# Patient Record
Sex: MALE | Race: WHITE | URBAN - METROPOLITAN AREA
[De-identification: names, ages, dates, MRNs, and addresses within clinical notes are randomized per-mention and may not be internally consistent; named-entity substitution may affect disease eponyms.]

---

## 2020-11-02 ENCOUNTER — APPOINTMENT (RX ONLY)
Dept: URBAN - METROPOLITAN AREA CLINIC 170 | Facility: CLINIC | Age: 30
Setting detail: DERMATOLOGY
End: 2020-11-02

## 2020-11-02 DIAGNOSIS — L259 CONTACT DERMATITIS AND OTHER ECZEMA, UNSPECIFIED CAUSE: ICD-10-CM

## 2020-11-02 PROBLEM — L23.9 ALLERGIC CONTACT DERMATITIS, UNSPECIFIED CAUSE: Status: ACTIVE | Noted: 2020-11-02

## 2020-11-02 PROCEDURE — ? TREATMENT REGIMEN

## 2020-11-02 PROCEDURE — ? COUNSELING

## 2020-11-02 PROCEDURE — 99202 OFFICE O/P NEW SF 15 MIN: CPT

## 2020-11-02 PROCEDURE — ? PRESCRIPTION

## 2020-11-02 PROCEDURE — ? OTHER

## 2020-11-02 RX ORDER — PIMECROLIMUS 10 MG/G
CREAM TOPICAL
Qty: 1 | Refills: 6 | Status: ERX | COMMUNITY
Start: 2020-11-02

## 2020-11-02 RX ADMIN — PIMECROLIMUS: 10 CREAM TOPICAL at 00:00

## 2020-11-02 ASSESSMENT — LOCATION DETAILED DESCRIPTION DERM
LOCATION DETAILED: RIGHT INFERIOR POSTAURICULAR SKIN
LOCATION DETAILED: LEFT INFERIOR POSTAURICULAR SKIN
LOCATION DETAILED: LEFT CENTRAL MALAR CHEEK
LOCATION DETAILED: STERNUM
LOCATION DETAILED: RIGHT CENTRAL MALAR CHEEK

## 2020-11-02 ASSESSMENT — LOCATION SIMPLE DESCRIPTION DERM
LOCATION SIMPLE: SCALP
LOCATION SIMPLE: RIGHT CHEEK
LOCATION SIMPLE: CHEST
LOCATION SIMPLE: LEFT CHEEK

## 2020-11-02 ASSESSMENT — LOCATION ZONE DERM
LOCATION ZONE: SCALP
LOCATION ZONE: TRUNK
LOCATION ZONE: FACE

## 2020-11-02 NOTE — PROCEDURE: TREATMENT REGIMEN
Continue Regimen: Patient may continue with duobrii and eucrisa. \\nHas prescriptions at home.
Plan: [facility]\\nRequesting path results from biopsy done at [facility] with Dr Ruben Patel.
Detail Level: Zone

## 2020-11-02 NOTE — PROCEDURE: OTHER
Note Text (......Xxx Chief Complaint.): This diagnosis correlates with the
Detail Level: Detailed
Other (Free Text): Here for second opinion about his dermatitis diagnosed by Ruben Patel at [facility]\\nBiopsied on left shoulder - will get the records\\nTold it was dermatitis\\n\\nRash on face and chest for over a year\\non face and L shoulder\\nbehind ears and post neck\\nUsing Duobri sparingly as needed for flares - helps\\nUsing Eucrisa every night - seems to help\\n\\nUsing aquaphor and eucerin daily \\nHas been on this regimen for a year\\nShe recommended injections once a month, but he does not remember the name of injection (poss Dupixant?)\\n\\nHas been using a non-paraben shampoo x many years\\nSame brand conditioner\\nUsed to use hair gel, but not lately while working from home\\nYet he is still getting the rash\\nUsing Revision skin care wash, for sensitive skin, cleansing lotion x over a year\\nEucerin daily hydration x 1 year  - started after the rash started\\nOld spice body wash x 3 years or so, but not on the face\\nKirkland pods in laundry for many years\\nDowny fabric softener free and clear x 6 months\\n\\nI suspect a product like his shampoo/conditioner is causing the rash because of its distribution on his face, neck, and upper trunk\\nHighly recommend patch testing at [facility]\\nChange shampoo/conditioner - see [email or web address] for suggestions\\nstart Elidel on face\\ncan con't Eucrisa\\ncan use Duobrii sparingly prn if he thinks it helps